# Patient Record
Sex: MALE | Race: WHITE | NOT HISPANIC OR LATINO | URBAN - METROPOLITAN AREA
[De-identification: names, ages, dates, MRNs, and addresses within clinical notes are randomized per-mention and may not be internally consistent; named-entity substitution may affect disease eponyms.]

---

## 2017-06-14 NOTE — PATIENT DISCUSSION
NONPROLIFERATIVE DIABETIC RETINOPATHY OU: STABLE RETINOPATHY -  RETURN FOR FOLLOW-UP AS SCHEDULED FOR DILATED EYE EXAM.

## 2020-01-09 NOTE — PATIENT DISCUSSION
NONPROLIFERATIVE DIABETIC RETINOPATHY OU: STABLE RETINOPATHY - RETURN FOR FOLLOW-UP AS SCHEDULED FOR DILATED EYE EXAM

## 2021-10-20 ENCOUNTER — NEW PATIENT COMPREHENSIVE (OUTPATIENT)
Dept: URBAN - METROPOLITAN AREA CLINIC 53 | Facility: CLINIC | Age: 71
End: 2021-10-20

## 2021-10-20 DIAGNOSIS — H25.13: ICD-10-CM

## 2021-10-20 DIAGNOSIS — H43.812: ICD-10-CM

## 2021-10-20 DIAGNOSIS — H04.123: ICD-10-CM

## 2021-10-20 DIAGNOSIS — H52.4: ICD-10-CM

## 2021-10-20 PROCEDURE — 92004 COMPRE OPH EXAM NEW PT 1/>: CPT

## 2021-10-20 ASSESSMENT — VISUAL ACUITY
OD_CC: J2
OU_CC: 20/20
OS_CC: J5
OD_CC: 20/20
OS_PH: 20/30
OU_SC: 20/30-1
OS_GLARE: 20/25
OS_GLARE: 20/30
OS_SC: 20/70
OD_SC: 20/30-1
OD_GLARE: 20/20
OS_CC: 20/40
OU_CC: J1@ 14 IN
OD_GLARE: 20/25

## 2021-10-20 ASSESSMENT — KERATOMETRY
OD_AXISANGLE2_DEGREES: 178
OS_AXISANGLE2_DEGREES: 161
OS_K1POWER_DIOPTERS: 40.75
OS_K2POWER_DIOPTERS: 42.00
OD_AXISANGLE_DEGREES: 088
OS_AXISANGLE_DEGREES: 71
OD_K2POWER_DIOPTERS: 42.50
OD_K1POWER_DIOPTERS: 41.50

## 2021-10-20 ASSESSMENT — TONOMETRY
OD_IOP_MMHG: 14
OS_IOP_MMHG: 14

## 2022-01-14 ENCOUNTER — FOLLOW UP (OUTPATIENT)
Dept: URBAN - METROPOLITAN AREA CLINIC 49 | Facility: CLINIC | Age: 72
End: 2022-01-14

## 2022-01-14 DIAGNOSIS — H25.13: ICD-10-CM

## 2022-01-14 DIAGNOSIS — H04.123: ICD-10-CM

## 2022-01-14 DIAGNOSIS — Z01.01: ICD-10-CM

## 2022-01-14 DIAGNOSIS — H43.812: ICD-10-CM

## 2022-01-14 DIAGNOSIS — H52.4: ICD-10-CM

## 2022-01-14 PROCEDURE — 92015 DETERMINE REFRACTIVE STATE: CPT

## 2022-01-14 PROCEDURE — 92014 COMPRE OPH EXAM EST PT 1/>: CPT

## 2022-01-14 ASSESSMENT — VISUAL ACUITY
OD_CC: 20/40
OS_CC: 20/30-1
OU_CC: J3@17"

## 2022-01-14 ASSESSMENT — TONOMETRY
OS_IOP_MMHG: 15
OD_IOP_MMHG: 14

## 2023-01-06 ENCOUNTER — COMPREHENSIVE EXAM (OUTPATIENT)
Dept: URBAN - METROPOLITAN AREA CLINIC 49 | Facility: CLINIC | Age: 73
End: 2023-01-06

## 2023-01-06 DIAGNOSIS — Z01.01: ICD-10-CM

## 2023-01-06 DIAGNOSIS — H25.13: ICD-10-CM

## 2023-01-06 DIAGNOSIS — H04.123: ICD-10-CM

## 2023-01-06 DIAGNOSIS — H43.812: ICD-10-CM

## 2023-01-06 PROCEDURE — 92015 DETERMINE REFRACTIVE STATE: CPT

## 2023-01-06 PROCEDURE — 92014 COMPRE OPH EXAM EST PT 1/>: CPT

## 2023-01-06 ASSESSMENT — VISUAL ACUITY
OS_GLARE: 20/20
OD_GLARE: 20/20
OS_CC: 20/20
OD_CC: 20/20
OD_GLARE: 20/20
OS_GLARE: 20/20
OU_CC: J1+@14

## 2023-01-06 ASSESSMENT — KERATOMETRY
OS_AXISANGLE_DEGREES: 80
OD_AXISANGLE_DEGREES: 96
OS_K2POWER_DIOPTERS: 42.25
OS_K1POWER_DIOPTERS: 41.25
OD_K1POWER_DIOPTERS: 41.50
OS_AXISANGLE2_DEGREES: 170
OD_K2POWER_DIOPTERS: 42.50
OD_AXISANGLE2_DEGREES: 6

## 2023-01-06 ASSESSMENT — TONOMETRY
OD_IOP_MMHG: 14
OS_IOP_MMHG: 14

## 2025-08-01 ENCOUNTER — COMPREHENSIVE EXAM (OUTPATIENT)
Age: 75
End: 2025-08-01

## 2025-08-01 DIAGNOSIS — Z01.01: ICD-10-CM

## 2025-08-01 DIAGNOSIS — H52.4: ICD-10-CM

## 2025-08-01 PROCEDURE — 92014 COMPRE OPH EXAM EST PT 1/>: CPT

## 2025-08-01 PROCEDURE — 92015 DETERMINE REFRACTIVE STATE: CPT
